# Patient Record
Sex: FEMALE | URBAN - METROPOLITAN AREA
[De-identification: names, ages, dates, MRNs, and addresses within clinical notes are randomized per-mention and may not be internally consistent; named-entity substitution may affect disease eponyms.]

---

## 2017-09-25 ENCOUNTER — HISTORICAL (OUTPATIENT)
Dept: ADMINISTRATIVE | Facility: HOSPITAL | Age: 47
End: 2017-09-25

## 2017-09-25 LAB
APPEARANCE, UA: NORMAL
BACTERIA SPEC CULT: NORMAL /HPF
BILIRUB UR QL STRIP: NEGATIVE
COLOR UR: YELLOW
GLUCOSE (UA): NEGATIVE
HGB UR QL STRIP: NEGATIVE
KETONES UR QL STRIP: NEGATIVE
LEUKOCYTE ESTERASE UR QL STRIP: NEGATIVE
NITRITE UR QL STRIP: NEGATIVE
PH UR STRIP: 7.5 [PH] (ref 5–9)
PROT UR QL STRIP: NEGATIVE
RBC #/AREA URNS HPF: NORMAL /[HPF]
SP GR UR STRIP: 1.02 (ref 1–1.03)
SQUAMOUS EPITHELIAL, UA: NORMAL
UROBILINOGEN UR STRIP-ACNC: 0.2
WBC #/AREA URNS HPF: NORMAL /[HPF]

## 2017-09-27 LAB — FINAL CULTURE: NO GROWTH

## 2018-07-17 ENCOUNTER — HISTORICAL (OUTPATIENT)
Dept: RADIOLOGY | Facility: HOSPITAL | Age: 48
End: 2018-07-17

## 2019-07-09 ENCOUNTER — HISTORICAL (OUTPATIENT)
Dept: ADMINISTRATIVE | Facility: HOSPITAL | Age: 49
End: 2019-07-09

## 2019-07-11 LAB — FINAL CULTURE: NORMAL

## 2021-09-15 ENCOUNTER — HISTORICAL (OUTPATIENT)
Dept: ADMINISTRATIVE | Facility: HOSPITAL | Age: 51
End: 2021-09-15

## 2021-09-17 LAB — FINAL CULTURE: NO GROWTH

## 2022-04-30 NOTE — PROGRESS NOTES
Patient:   Elvira Zarate            MRN: [id number]            FIN: [id number]               Age:   46 years     Sex:  Female     :  1970   Associated Diagnoses:   None   Author:   Chanel Traore      Chief Complaint   6 month follow up for vaginal pap smear for vaginal dysplasia (VAIN1)      Interval History   Elvira Rothman is a 44 yo WF;  with hx of TLH in  for endometriosis and uterine fibroids not currently on HRT; PMH of Hypothyroidism, Anxiety, Condyloma removal in . Patient presented to local gynecologist Dr. Jelani Castellon for routine GYN exam in 2016. 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 Colposcopy with vaginal cuff biopsy on 16 with pathological findings of Mild Squamous Dysplasia (VAIN I); after lengthy detailed discussion on 16 regarding her treatment options, the patient elected observation with vaginal Pap smear and high-risk HPV every 6 months and only treat with progressive disease, declining topical or CO2 laser treatment.           Health Status   Allergies:    Allergic Reactions (All)  Severe  Allergy to penicillin- Hives.,    Allergies (1) Active Reaction  Allergy to penicillin hives     Current medications:  (Selected)   Documented Medications  Documented  Nature-Throid: 113.75 mg, 0 Refill(s)  Oracea 40 mg oral delayed release capsule: 40 mg = 1 cap(s), Oral, Once, # 30 cap(s), 0 Refill(s)  Zoloft 50 mg oral tablet: 50 mg = 1 tab(s), Oral, Daily, # 30 tab(s), 0 Refill(s)   Problem list:    No qualifying data available        Histories   Past Medical History:    Resolved  Hypertension ([id number]):  Resolved.  Glasses ([id number]):  Resolved.  Hypothyroidism (11390238):  Resolved.  Constipated ([id number]):  Resolved.  Reflux ([id number]):  Resolved.  Depression  ([id number]):  Resolved.  Anxiety ([id number]):  Resolved.   Procedure history:    Cryosurgery Anal Lesion on 2015 at 45 Years.  Comments:  2015 13:28 - Ang Paez RN  auto-populated from documented surgical case  Colonoscopy on 2015 at 45 Years.  Comments:  2015 13:28 - Ang Paez RN  auto-populated from documented surgical case  Hysterectomy ([id number]) on 2013 at 42 Years.  Tonsillectomy ([id number]) in  at 11 Years.  EGD.  uterine fibroids removed.      Gynecologic history   Menstrual cycle: date of last menstrual period 2013.   Pregnancy status:  0, para 0.   Pap test: 3 months ago.   Mammogram: last 1, had one today.   Family History:    Multiple myeloma  Father     Social History        Social & Psychosocial Habits    Alcohol  2015 Risk Assessment: Low Risk    2015  Type: Beer, Liquor, Wine    Comment: occasionally - 2015 11:10 - Emerson WEBB, Terri CORDOBA    Employment/School  2016  Status: Employed    Description: / full time    Home/Environment  2016  Lives with: grandmother    Substance Abuse  2015 Risk Assessment: Denies Substance Abuse    2016  Use: Never    Tobacco  2015 Risk Assessment: Denies Tobacco Use    2016  Use: Never smoker  .        Review of Systems   Integumentary:  Negative.    Neurologic:  Negative.    Psychiatric:  Negative.    Endocrine:  Negative.    Genitourinary:  Lower abdominal discomfort, No genital irritation reported.         Sexual function: Normal.    Hematology/Lymphatics:  Negative.    Immunologic:  Negative.    Constitutional:  Negative.    Eye:  Negative.    Ear/Nose/Mouth/Throat:  Negative.    Cardiovascular:  Negative.    Respiratory:  Negative.    Gastrointestinal:  Negative.    Musculoskeletal:  Negative.    Breast:  Negative.    All other systems are negative      Physical Examination   Chaperone  present:  During the entire physical exam, Treasure Huynh MA.    Vital Signs   9/25/2017 8:42 CDT       Peripheral Pulse Rate     66 bpm                             Systolic Blood Pressure   136 mmHg                             Diastolic Blood Pressure  93 mmHg  HI     Measurements from flowsheet : Measurements   9/25/2017 8:42 CDT       Weight Dosing             48.3 kg                             Weight Measured           48.3 kg                             Weight Measured and Calculated in Lbs     106.48 lb                             Weight Estimated          48.3 kg                             Height/Length Dosing      157 cm                             Height/Length Measured    157 cm                             Height/Length Estimated   157 cm                             BSA Measured              1.45 m2                             BSA Estimated             1.45 m2                             Body Mass Index Estimated 19.6 kg/m2                             Body Mass Index Measured  19.6 kg/m2     General:  Alert and oriented, No acute distress.    Eye:  Pupils are equal, round and reactive to light, Extraocular movements are intact, Normal conjunctiva.    HENT:  Normocephalic, Normal hearing, Oral mucosa is moist.    Neck:  Supple, Non-tender, No jugular venous distention, No lymphadenopathy.    Respiratory:  Lungs are clear to auscultation, Respirations are non-labored, Breath sounds are equal, Symmetrical chest wall expansion.    Cardiovascular:  Normal rate, Regular rhythm, Normal peripheral perfusion, No edema.    Breast:  Deferred.    Gastrointestinal:  Soft, Non-tender, Non-distended, Normal bowel sounds, No organomegaly.    Genitourinary:  No costovertebral angle tenderness, No inguinal tenderness, No urethral discharge, No lesions.         Vulva: Within normal limits.         Urethra: Within normal limits.         Vagina: Within normal limits, No lesions or masses; thick white vaginal discharge  "without bleeding. Vaginal pap smear collected. .         Bartholin's/skene's glands: WNL.    Lymphatics:  No lymphadenopathy neck, axilla, groin.    Musculoskeletal:  Normal range of motion, Normal strength, No tenderness, No swelling.    Integumentary:  Warm, Dry, Pink, Intact, No pallor, No rash.    Neurologic:  Alert, Oriented, Normal sensory, Normal motor function, No focal deficits, Cranial Nerves II-XII are grossly intact.    Cognition and Speech:  Oriented.    Psychiatric:  Cooperative, Appropriate mood & affect, Normal judgment, Non-suicidal.       Review / Management   Final Diagnosis  VAGINAL CUFF BIOPSY:    MILD SQUAMOUS DYSPLASIA (VaIN I).     COMMENT:  These findings correlate with the previous LGSIL - HPV + pap smear (P-) which was reviewed in conjunction with the current material.       *Electronically Signed*     Renato Jones MD  Verified: 16 13:17      Specimen Description  1 Vaginal Cuff Biopsy    Clinical Information       Pre-Operative Diagnosis:     LGSIL    Gross Description  Received in formalin labeled "vaginal cuff biopsy" is an unoriented fragment of gray white soft tissue measuring 0.1x0.1x less than 0.1 cm.  The specimen is placed on a specimen filter paper and submitted in its entirety in a single cassette labeled 8141. 0-1-1 (extremely small specimen).      RLM/EC    Microscopic Description  Microscopic examination performed.  Please see diagnosis.         Impression and Plan   Elvira Rothman is a 46 yo WF;  with hx of TLH in  for endometriosis and uterine fibroids not currently on HRT; PMH of Hypothyroidism, Anxiety, Condyloma removal in . Patient presented to local gynecologist Dr. Jelani Castellon for routine GYN exam in 2016. 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 Colposcopy with vaginal cuff biopsy on 16 with pathological findings of Mild Squamous Dysplasia (VAIN I); after lengthy detailed discussion on 16 regarding " her treatment options, the patient elected observation with vaginal Pap smear and high-risk HPV every 6 months and only treat with progressive disease, declining topical or CO2 laser treatment.    Vaginal Pap Smear:  9/25/16: Normal; negative HR HPV  03/27/17: Normal; negative HR HPV  09/25/17: collected       Plan  Ms. Rothman presented 09/25/17 for a  6 month follow up/vaginal pap smear for VAIN 1/History of HR HPV. Complaining of lower abdominal/pelvic pain since starting new acne medication last week; no dysuria, hematuria, vaginal bleeding or discahrge. Her general exam was benign. External genitalia appeared normal. Speculum exam revealed thick white vaginal discharge without vaginal erythema; no obvious vaginal lesions/masses, a vaginal Pap smear was collected and sent for cytology and reflex HPV testing/candida testing. CCMS UA and Urine C&S collected in clinic; I will follow up with patient with these results. Rx for diflucan 100 mg PO now and repeat in 1 week if needed (escribed to patient's pharmacy). I recommend that she start probiotics daily since she is on daily low dose doxycycline for acne by her dermatologist. She was asked to call the office in 2 weeks if she did not hear back from her Pap smear results. I reviewed her last pap smear from 03/27/17 which was negative for intraepithelial lesion or malignancy; HR HPV was negative.  If today's pap smear is normal; we will release her back to Dr. Castellon to continue routine well woman exams. I was available for questions answered them to patient's satisfaction in lay terms to best of my ability. Ms. Rothman verbalized understanding of her condition and agrees with this plan of care. She was very pleased with her office visit today and was discharged home with no unanswered questions.      Professional Services   Consulting Physician/GYN:  Jelani Castellon MD.  [phone]; Fax number   PCP: [facility]  [facility]: Arielle  MD Nacho